# Patient Record
Sex: MALE | Race: WHITE | Employment: FULL TIME | URBAN - METROPOLITAN AREA
[De-identification: names, ages, dates, MRNs, and addresses within clinical notes are randomized per-mention and may not be internally consistent; named-entity substitution may affect disease eponyms.]

---

## 2021-03-27 ENCOUNTER — HOSPITAL ENCOUNTER (EMERGENCY)
Age: 31
Discharge: HOME | End: 2021-03-28
Attending: EMERGENCY MEDICINE
Payer: COMMERCIAL

## 2021-03-27 VITALS
SYSTOLIC BLOOD PRESSURE: 119 MMHG | HEART RATE: 94 BPM | HEIGHT: 71 IN | TEMPERATURE: 97.5 F | BODY MASS INDEX: 27.34 KG/M2 | WEIGHT: 195.3 LBS | RESPIRATION RATE: 14 BRPM | OXYGEN SATURATION: 97 % | DIASTOLIC BLOOD PRESSURE: 70 MMHG

## 2021-03-27 DIAGNOSIS — S61.211A LACERATION OF LEFT INDEX FINGER WITHOUT FOREIGN BODY WITHOUT DAMAGE TO NAIL, INITIAL ENCOUNTER: Primary | ICD-10-CM

## 2021-03-27 PROCEDURE — 99283 EMERGENCY DEPT VISIT LOW MDM: CPT

## 2021-03-27 PROCEDURE — 12001 RPR S/N/AX/GEN/TRNK 2.5CM/<: CPT

## 2021-03-27 ASSESSMENT — PAIN DESCRIPTION - PAIN TYPE: TYPE: ACUTE PAIN

## 2021-03-27 ASSESSMENT — PAIN DESCRIPTION - ORIENTATION: ORIENTATION: LEFT

## 2021-03-28 NOTE — ED PROVIDER NOTES
[address] [facility]  [facility]      Pt Name: Roxie Castellanos  MRN: [id number]  [place] 1990  Date ofevaluation: 3/27/2021  Provider: Jovana Bedoya MD    CHIEF COMPLAINT       Chief Complaint   Patient presents with    Laceration     Cut left index finger on kitchen knife. States Tetanus is UTD       HPI    HISTORY OF PRESENT ILLNESS   (Location/Symptom, Timing/Onset,Context/Setting, Quality, Duration, Modifying Factors, Severity)  Note limiting factors. Roxie Castellanos is a 26 y.o. male who presents to the emergency department with a laceration. This is a 53-year-old male who states he was cutting some food when inadvertently lacerated the tip of his left index finger. This happened his prior to arrival.  He states his last tetanus was 8 years ago. This was a clean knife. NursingNotes were reviewed. Review of Systems    REVIEW OF SYSTEMS    (2-9 systems for level 4, 10 or more for level 5)     Review of Systems   Constitutional: Negative for fever. HENT: Negative for rhinorrhea and sore throat. Eyes: Negative for redness. Respiratory: Negative for shortness of breath. Cardiovascular: Negative for chest pain. Gastrointestinal: Negative for abdominal pain. Genitourinary: Negative for flank pain. Neurological: Negative for headaches. Hematological: Negative for adenopathy. Psychiatric/Behavioral: Negative for confusion. Except as noted above the remainder of the review of systems was reviewed and negative. PAST MEDICAL HISTORY   History reviewed. No pertinent past medical history. SURGICALHISTORY     History reviewed. No pertinent surgical history. CURRENT MEDICATIONS       Previous Medications    No medications on file       ALLERGIES     Patient has no known allergies. FAMILY HISTORY     History reviewed. No pertinent family history.        SOCIAL HISTORY       Social History     Socioeconomic History    Marital status: Single     Spouse name: None    Number of children: None    Years of education: None    Highest education level: None   Occupational History    None   Social Needs    Financial resource strain: None    Food insecurity     Worry: None     Inability: None    Transportation needs     Medical: None     Non-medical: None   Tobacco Use    Smoking status: Never Smoker    Smokeless tobacco: Never Used   Substance and Sexual Activity    Alcohol use: None    Drug use: None    Sexual activity: None   Lifestyle    Physical activity     Days per week: None     Minutes per session: None    Stress: None   Relationships    Social connections     Talks on phone: None     Gets together: None     Attends Yarsani service: None     Active member of club or organization: None     Attends meetings of clubs or organizations: None     Relationship status: None    Intimate partner violence     Fear of current or ex partner: None     Emotionally abused: None     Physically abused: None     Forced sexual activity: None   Other Topics Concern    None   Social History Narrative    None       SCREENINGS             PHYSICAL EXAM    (up to 7 for level 4, 8 or more for level 5)     ED Triage Vitals [03/27/21 2326]   BP Temp Temp Source Pulse Resp SpO2 Height Weight   119/70 97.5 °F (36.4 °C) Oral 94 14 97 % 5' 11\" (1.803 m) 195 lb 4.8 oz (88.6 kg)       Physical Exam:      General Appearance:  Alert, cooperative, appears stated age. Head:  Normocephalic, without obvious abnormality, atraumatic. Eyes:  conjunctiva/corneas clear, EOM's intact. Sclera anicteric. ENT:  Mucous remains moist and pink   Neck: Supple, symmetrical, trachea midline, no adenopathy. No jugular venous distention. Extremities:  Small superficial laceration on the distal tip of his left index finger. There is no active bleeding noted. No foreign objects noted. No damage to the nailbed.    Pulses:  Good throughout Skin:  No rashes or lesions to exposed skin. Neurologic: Alert and oriented X 3. DIAGNOSTIC RESULTS         RADIOLOGY:   Non-plain filmimages such as CT, Ultrasound and MRI are read by the radiologist. Plain radiographic images are visualized and preliminarily interpreted by the emergency physician with the below findings:    See below    Interpretation per the Radiologist below, if available at the time ofthis note: All incidental findings were discussed with the patient. No orders to display         ED BEDSIDE ULTRASOUND:   Performed by ED Physician - none    LABS:  Labs Reviewed - No data to display    All other labs were within normal range or not returned as of this dictation. EMERGENCY DEPARTMENT COURSE and DIFFERENTIAL DIAGNOSIS/MDM:   Vitals:    Vitals:    03/27/21 2326   BP: 119/70   Pulse: 94   Resp: 14   Temp: 97.5 °F (36.4 °C)   TempSrc: Oral   SpO2: 97%   Weight: 195 lb 4.8 oz (88.6 kg)   Height: 5' 11\" (1.803 m)           MDM    The patient has remained stable throughout his hospital course. After my initial evaluation we decided to proceed with a Dermabond repair. The area was small enough to repair with Dermabond. After drying the area I applied some Dermabond and the patient tolerated the procedure well. Good hemostasis was obtained. The patient will be discharged with Dermabond instructions. REASSESSMENT              CONSULTS:  None    PROCEDURES:  Unless otherwise noted below, none  The area was dried and inspected. No foreign objects are noted. I proceeded with a Dermabond repair in the usual manner. The patient tolerated the procedure well and there were no difficulties. Good hemostasis was obtained. Procedures    FINAL IMPRESSION      1.  Laceration of left index finger without foreign body without damage to nail, initial encounter          DISPOSITION/PLAN   DISPOSITION Decision To Discharge 03/27/2021 11:44:30 PM      PATIENT REFERREDTO:  Your primary care physician    Call in 3 days  As needed      DISCHARGEMEDICATIONS:  New Prescriptions    No medications on file     Controlled Substances Monitoring:     No flowsheet data found.     (Please note that portions of this note were completed with a voice recognition program.  Efforts were made to edit the dictations but occasionally words are mis-transcribed.)    Maureen Adams MD (electronically signed)  Attending Emergency Physician          Maureen Adams MD  03/27/21 2603

## 2021-03-28 NOTE — ED NOTES
Soaking pts finger in Hibiclens and saline. Pt. Tolerating well.      Lila Perdomo, P  03/27/21 1696 Home